# Patient Record
Sex: MALE | HISPANIC OR LATINO | Employment: OTHER | URBAN - METROPOLITAN AREA
[De-identification: names, ages, dates, MRNs, and addresses within clinical notes are randomized per-mention and may not be internally consistent; named-entity substitution may affect disease eponyms.]

---

## 2022-06-14 DIAGNOSIS — G56.03 BILATERAL CARPAL TUNNEL SYNDROME: Primary | ICD-10-CM

## 2022-08-22 ENCOUNTER — PROCEDURE VISIT (OUTPATIENT)
Dept: NEUROLOGY | Facility: CLINIC | Age: 47
End: 2022-08-22
Payer: OTHER GOVERNMENT

## 2022-08-22 DIAGNOSIS — G56.03 BILATERAL CARPAL TUNNEL SYNDROME: ICD-10-CM

## 2022-08-22 PROCEDURE — 95885 MUSC TST DONE W/NERV TST LIM: CPT | Mod: PBBFAC | Performed by: PSYCHIATRY & NEUROLOGY

## 2022-08-22 PROCEDURE — 95913 PR NERVE CONDUCTION STUDY; 13 OR MORE STUDIES: ICD-10-PCS | Mod: 26,S$PBB,, | Performed by: PSYCHIATRY & NEUROLOGY

## 2022-08-22 PROCEDURE — 95885 PR MUSC TST DONE W/NERV TST LIM: ICD-10-PCS | Mod: 26,S$PBB,, | Performed by: PSYCHIATRY & NEUROLOGY

## 2022-08-22 PROCEDURE — 95885 MUSC TST DONE W/NERV TST LIM: CPT | Mod: 26,S$PBB,, | Performed by: PSYCHIATRY & NEUROLOGY

## 2022-08-22 PROCEDURE — 95913 NRV CNDJ TEST 13/> STUDIES: CPT | Mod: 26,S$PBB,, | Performed by: PSYCHIATRY & NEUROLOGY

## 2022-08-22 PROCEDURE — 95911 NRV CNDJ TEST 9-10 STUDIES: CPT | Mod: PBBFAC | Performed by: PSYCHIATRY & NEUROLOGY

## 2022-08-22 NOTE — PROCEDURES
Procedures     EMG/NCS Report      REFERRING PHYSICIAN: Dr. Truong    REASON FOR EVALUATION: carpal tunnel syndrome    HISTORY OF PRESENT ILLNESS: 46 y.o. year old left handed  male here for numbness and tingling in the upper extremities.    EXAMINATION: Limited neurologic examination shows 5/5 strength in BUE.    NERVE CONDUCTION STUDIES: Bilateral ulnar, radial antidromic sensory nerve conduction study showed normal peak latencies and normal amplitudes.  Left median antidromic sensory nerve conduction study showed prolonged peak latency and normal amplitude. Left median antidromic sensory nerve conduction study showed normal peak latency and normal amplitude.  Bilateral median orthodromic sensory nerve conduction studies showed prolonged peak latencies and low amplitudes. The median to ulnar othodromic peak latency difference was prolonged bilaterally.  Right median motor nerve conduction studies showed prolonged distal motor latency, normal CMAP amplitude and normal motor nerve conduction velocities.  The left median motor nerve conduction study showed normal distal motor latency, normal CMAP amplitude, and normal motor nerve conduction velocities. The median to ulnar distal motor latency difference was prolonged bilaterally. Bilateral ulnar motor nerve conduction study showed normal distal motor latencies, normal CMAP amplitudes, normal motor nerve conduction velocities.   F wave minimal latencies were within normal limits.     NEEDLE EMG: Concentric needle EMG was performed on the right first dorsal interosseous, right abductor pollicis brevis, left first dorsal interosseous, left abductor pollicis brevis.   All muscles tested were normal.    IMPRESSION: This is an abnormal electrophysiological study. This study shows electrodiagnostic evidence of bilateral moderate median neuropathy at the wrist consistent with carpal tunnel syndrome.